# Patient Record
Sex: MALE | Race: WHITE | NOT HISPANIC OR LATINO | URBAN - METROPOLITAN AREA
[De-identification: names, ages, dates, MRNs, and addresses within clinical notes are randomized per-mention and may not be internally consistent; named-entity substitution may affect disease eponyms.]

---

## 2017-12-02 ENCOUNTER — EMERGENCY (EMERGENCY)
Facility: HOSPITAL | Age: 67
LOS: 1 days | Discharge: HOME | End: 2017-12-02
Attending: EMERGENCY MEDICINE | Admitting: EMERGENCY MEDICINE
Payer: MEDICARE

## 2017-12-02 VITALS
HEIGHT: 76 IN | RESPIRATION RATE: 17 BRPM | SYSTOLIC BLOOD PRESSURE: 150 MMHG | DIASTOLIC BLOOD PRESSURE: 88 MMHG | HEART RATE: 80 BPM | OXYGEN SATURATION: 100 % | WEIGHT: 225.09 LBS | TEMPERATURE: 98 F

## 2017-12-02 DIAGNOSIS — M77.9 ENTHESOPATHY, UNSPECIFIED: ICD-10-CM

## 2017-12-02 DIAGNOSIS — Z79.899 OTHER LONG TERM (CURRENT) DRUG THERAPY: ICD-10-CM

## 2017-12-02 DIAGNOSIS — Z79.891 LONG TERM (CURRENT) USE OF OPIATE ANALGESIC: ICD-10-CM

## 2017-12-02 DIAGNOSIS — M79.672 PAIN IN LEFT FOOT: ICD-10-CM

## 2017-12-02 PROCEDURE — 73620 X-RAY EXAM OF FOOT: CPT | Mod: 26,LT

## 2017-12-02 PROCEDURE — 99283 EMERGENCY DEPT VISIT LOW MDM: CPT | Mod: 25

## 2017-12-02 PROCEDURE — 73630 X-RAY EXAM OF FOOT: CPT | Mod: 26,LT

## 2017-12-02 NOTE — ED ADULT NURSE NOTE - CHPI ED SYMPTOMS NEG
no nausea/no numbness/no tingling/no vomiting/no chills/no fever/no weakness/no decreased eating/drinking

## 2017-12-02 NOTE — ED PROVIDER NOTE - DIAGNOSTIC INTERPRETATION
Interpreted by MD  left foot_ x-ray, 3_ views  No fracture, no dislocation (joint spaces grossly normal), no Foreign Body noted, soft tissue normal, +osteophytes calcaneus.

## 2017-12-02 NOTE — ED ADULT TRIAGE NOTE - CHIEF COMPLAINT QUOTE
Pt states he has chronic tendonitis in his left leg, today he stepped off a curb and since then has been having worse pain that usual

## 2017-12-02 NOTE — ED PROVIDER NOTE - OBJECTIVE STATEMENT
68 yo male hx of chronic tendinitis left foot to ED c/o worsening pain/swelling left heel after stepping off of a curb yesterday.  no recent quinolone use. able to ambulate. denies fever/chills. Pt has has several prior episodes of recurrent tendinitis for which podiatry has injected steroids into tendon.

## 2019-04-02 ENCOUNTER — EMERGENCY (EMERGENCY)
Facility: HOSPITAL | Age: 69
LOS: 1 days | Discharge: HOME | End: 2019-04-02
Admitting: EMERGENCY MEDICINE
Payer: MEDICARE

## 2019-04-02 VITALS
SYSTOLIC BLOOD PRESSURE: 130 MMHG | RESPIRATION RATE: 18 BRPM | OXYGEN SATURATION: 100 % | DIASTOLIC BLOOD PRESSURE: 81 MMHG | HEART RATE: 77 BPM | TEMPERATURE: 98 F

## 2019-04-02 VITALS
TEMPERATURE: 98 F | HEART RATE: 57 BPM | OXYGEN SATURATION: 97 % | RESPIRATION RATE: 18 BRPM | SYSTOLIC BLOOD PRESSURE: 113 MMHG | DIASTOLIC BLOOD PRESSURE: 74 MMHG

## 2019-04-02 DIAGNOSIS — I10 ESSENTIAL (PRIMARY) HYPERTENSION: ICD-10-CM

## 2019-04-02 DIAGNOSIS — R05 COUGH: ICD-10-CM

## 2019-04-02 DIAGNOSIS — E78.5 HYPERLIPIDEMIA, UNSPECIFIED: ICD-10-CM

## 2019-04-02 DIAGNOSIS — R09.81 NASAL CONGESTION: ICD-10-CM

## 2019-04-02 DIAGNOSIS — Z79.899 OTHER LONG TERM (CURRENT) DRUG THERAPY: ICD-10-CM

## 2019-04-02 PROCEDURE — 99283 EMERGENCY DEPT VISIT LOW MDM: CPT

## 2019-04-02 PROCEDURE — 71046 X-RAY EXAM CHEST 2 VIEWS: CPT | Mod: 26

## 2019-04-02 RX ORDER — SPIRONOLACTONE 25 MG/1
1 TABLET, FILM COATED ORAL
Qty: 0 | Refills: 0 | COMMUNITY

## 2019-04-02 RX ORDER — METOPROLOL TARTRATE 50 MG
1 TABLET ORAL
Qty: 0 | Refills: 0 | COMMUNITY

## 2019-04-02 RX ORDER — ATORVASTATIN CALCIUM 80 MG/1
1 TABLET, FILM COATED ORAL
Qty: 0 | Refills: 0 | COMMUNITY

## 2019-04-02 RX ORDER — ESCITALOPRAM OXALATE 10 MG/1
1 TABLET, FILM COATED ORAL
Qty: 0 | Refills: 0 | COMMUNITY

## 2019-04-02 NOTE — ED ADULT TRIAGE NOTE - CHIEF COMPLAINT QUOTE
Patient to ED with complaint of cough, congestion, headache X 1 week.  Taking mucinex with no relief

## 2019-04-02 NOTE — ED PROVIDER NOTE - CLINICAL SUMMARY MEDICAL DECISION MAKING FREE TEXT BOX
pt. with dry cough/URi sx X 3 wks. vss, well appearing, exam unremarkable. suspect viral syndrome, will do cheat x- ray , re-asses. pt. with dry cough/URi sx X 3 wks. vss, well appearing, exam unremarkable. suspect viral syndrome, will do cheat x- ray , re-asses.  pt. x- ray + b/l atelectasis vs scarring, otherwise no acute findings. no clinical indication for abx, vss, well appearing, recommend anti histamine, f/u with pulmonology.

## 2019-04-02 NOTE — ED PROVIDER NOTE - OBJECTIVE STATEMENT
Pt. with HTN, HLPD, just moved to [facility] 1 mt ago from [place]. Pt. presenting with 3 wks for dry cough , cough mostly in evening and morning. + nasal congestion. no fever/chills, no SOB, no CP, no N/V, non- smoker, no sick contact. Taking otc antihistamine and Mucinex cough suppressants.

## 2019-04-02 NOTE — ED PROVIDER NOTE - NSFOLLOWUPINSTRUCTIONS_ED_ALL_ED_FT
Cat Leon)  Critical Care Medicine; Pulmonary Disease  [address] [place] [address]  Phone: [phone]  Fax: [phone]  Follow Up Time:

## 2019-04-02 NOTE — ED PROVIDER NOTE - CARE PROVIDER_API CALL
Cat Leon)  Critical Care Medicine; Pulmonary Disease  [address] [place] [address]  Phone: [phone]  Fax: [phone]  Follow Up Time:

## 2019-04-02 NOTE — ED ADULT NURSE NOTE - OBJECTIVE STATEMENT
Productive cough clear sputum [id number], admits to taking Mucinex which minimally relieves cough.   Admits to dyspnea on exertion +URI symptoms Denies fever, chills, sick contacts, recent travel, rash, ankle swelling, hemoptysis, chest pain or orthopnea

## 2020-02-04 NOTE — ED PROVIDER NOTE - MUSCULOSKELETAL MINIMAL EXAM
Likely secondary to hemorrhoid and/or anal fissure  No bleeding over the past 2 days  Advised high fiber diet, hydrate  Alarm signs / return precautions discussed   normal range of motion/neg Estrada's test. distal nvi, no divet over achilles tendon. no Cord/induration/tenderness gastroc.

## 2020-02-13 ENCOUNTER — EMERGENCY (EMERGENCY)
Facility: HOSPITAL | Age: 70
LOS: 1 days | Discharge: HOME | End: 2020-02-13
Attending: EMERGENCY MEDICINE | Admitting: EMERGENCY MEDICINE
Payer: MEDICARE

## 2020-02-13 VITALS
HEART RATE: 73 BPM | OXYGEN SATURATION: 98 % | DIASTOLIC BLOOD PRESSURE: 90 MMHG | TEMPERATURE: 98 F | RESPIRATION RATE: 16 BRPM | WEIGHT: 229.94 LBS | HEIGHT: 76 IN | SYSTOLIC BLOOD PRESSURE: 149 MMHG

## 2020-02-13 DIAGNOSIS — M54.12 RADICULOPATHY, CERVICAL REGION: ICD-10-CM

## 2020-02-13 DIAGNOSIS — M54.2 CERVICALGIA: ICD-10-CM

## 2020-02-13 DIAGNOSIS — Z79.899 OTHER LONG TERM (CURRENT) DRUG THERAPY: ICD-10-CM

## 2020-02-13 DIAGNOSIS — I10 ESSENTIAL (PRIMARY) HYPERTENSION: ICD-10-CM

## 2020-02-13 DIAGNOSIS — R20.2 PARESTHESIA OF SKIN: ICD-10-CM

## 2020-02-13 DIAGNOSIS — E78.5 HYPERLIPIDEMIA, UNSPECIFIED: ICD-10-CM

## 2020-02-13 PROBLEM — N28.9 DISORDER OF KIDNEY AND URETER, UNSPECIFIED: Chronic | Status: ACTIVE | Noted: 2019-04-02

## 2020-02-13 PROBLEM — F32.9 MAJOR DEPRESSIVE DISORDER, SINGLE EPISODE, UNSPECIFIED: Chronic | Status: ACTIVE | Noted: 2019-04-02

## 2020-02-13 PROCEDURE — 99283 EMERGENCY DEPT VISIT LOW MDM: CPT

## 2020-02-13 RX ORDER — OXYCODONE AND ACETAMINOPHEN 5; 325 MG/1; MG/1
1 TABLET ORAL ONCE
Refills: 0 | Status: DISCONTINUED | OUTPATIENT
Start: 2020-02-13 | End: 2020-02-13

## 2020-02-13 RX ADMIN — OXYCODONE AND ACETAMINOPHEN 1 TABLET(S): 5; 325 TABLET ORAL at 17:24

## 2020-02-13 NOTE — ED PROVIDER NOTE - PATIENT PORTAL LINK FT
You can access the FollowMyHealth Patient Portal offered by [facility] by registering at the following website: [email or web address]. By joining [employer]’s FollowMyHealth portal, you will also be able to view your health information using other applications (apps) compatible with our system.

## 2020-02-13 NOTE — ED PROVIDER NOTE - CARE PROVIDER_API CALL
Raghav Alexander)  Pain Medicine; PhysicalRehab Medicine  [address] [place] [address]  Phone: [phone]  Fax: [phone]  Follow Up Time:

## 2020-02-13 NOTE — ED PROVIDER NOTE - MUSCULOSKELETAL, MLM
Spine appears normal, range of motion is not limited, no muscle or joint tenderness, strength 5/5, equal and symmetric.

## 2020-02-13 NOTE — ED PROVIDER NOTE - CLINICAL SUMMARY MEDICAL DECISION MAKING FREE TEXT BOX
Pt with neck pain x 2 weeks. Will give percocet for pain. Pt advised to follow up with PMD and outpatient pain management.

## 2020-02-13 NOTE — ED PROVIDER NOTE - OBJECTIVE STATEMENT
68 yo M w/ PMHx of HTN and chronic renal insufficiency c/o left sided neck pain radiating down left arm with intermittent tingling. Pt also c/o  pain with ROM. No weakness, no trauma, no fall. Pt also endorses history of herniated lumbar discs and tendonitis; pt states he had leftover NSAIDs and took 1 week steroid taper with minimal improvement.

## 2020-02-13 NOTE — ED ADULT TRIAGE NOTE - CHIEF COMPLAINT QUOTE
"I have a pinched nerve" neck pain radiating to right arm x 2.5 weeks History of tonsillectomy  2016

## 2020-02-13 NOTE — ED PROVIDER NOTE - DURATION
4/5/2017        Mar Garcia  [address]  [facility] [address]        Dear Mar Garcia    Your test results from your last visit have returned.  Your stress test was normal.   If you have any further questions, please feel free to call.              Sincerely,      [place] [address] [phone]     week(s)

## 2020-02-13 NOTE — ED PROVIDER NOTE - PMH
Depression    Herniated lumbar intervertebral disc    HTN (hypertension)    Hyperlipemia    Kidney disease

## 2023-08-17 NOTE — ED ADULT TRIAGE NOTE - IDEAL BODY WEIGHT(KG)
previous_biopsy_has_been_previously_biopsied Has The Growth Been Previously Biopsied?: has been previously biopsied 87
